# Patient Record
Sex: MALE | Race: OTHER | HISPANIC OR LATINO
[De-identification: names, ages, dates, MRNs, and addresses within clinical notes are randomized per-mention and may not be internally consistent; named-entity substitution may affect disease eponyms.]

---

## 2017-10-05 ENCOUNTER — APPOINTMENT (OUTPATIENT)
Dept: ORTHOPEDIC SURGERY | Facility: CLINIC | Age: 32
End: 2017-10-05

## 2017-10-05 PROBLEM — Z00.00 ENCOUNTER FOR PREVENTIVE HEALTH EXAMINATION: Status: ACTIVE | Noted: 2017-10-05

## 2018-01-04 ENCOUNTER — APPOINTMENT (OUTPATIENT)
Dept: ORTHOPEDIC SURGERY | Facility: CLINIC | Age: 33
End: 2018-01-04

## 2018-01-25 ENCOUNTER — APPOINTMENT (OUTPATIENT)
Dept: ORTHOPEDIC SURGERY | Facility: CLINIC | Age: 33
End: 2018-01-25
Payer: MEDICAID

## 2018-01-25 ENCOUNTER — OUTPATIENT (OUTPATIENT)
Dept: OUTPATIENT SERVICES | Facility: HOSPITAL | Age: 33
LOS: 1 days | End: 2018-01-25
Payer: COMMERCIAL

## 2018-01-25 VITALS — BODY MASS INDEX: 28.14 KG/M2 | WEIGHT: 190 LBS | HEIGHT: 69 IN

## 2018-01-25 DIAGNOSIS — M25.511 PAIN IN RIGHT SHOULDER: ICD-10-CM

## 2018-01-25 DIAGNOSIS — G89.29 PAIN IN RIGHT SHOULDER: ICD-10-CM

## 2018-01-25 PROCEDURE — 73030 X-RAY EXAM OF SHOULDER: CPT

## 2018-01-25 PROCEDURE — 99203 OFFICE O/P NEW LOW 30 MIN: CPT

## 2018-01-25 PROCEDURE — 73030 X-RAY EXAM OF SHOULDER: CPT | Mod: 26,50

## 2021-01-28 ENCOUNTER — TRANSCRIPTION ENCOUNTER (OUTPATIENT)
Age: 36
End: 2021-01-28

## 2021-02-25 ENCOUNTER — RESULT REVIEW (OUTPATIENT)
Age: 36
End: 2021-02-25

## 2021-02-25 ENCOUNTER — OUTPATIENT (OUTPATIENT)
Dept: OUTPATIENT SERVICES | Facility: HOSPITAL | Age: 36
LOS: 1 days | End: 2021-02-25
Payer: COMMERCIAL

## 2021-02-25 ENCOUNTER — APPOINTMENT (OUTPATIENT)
Dept: ORTHOPEDIC SURGERY | Facility: CLINIC | Age: 36
End: 2021-02-25

## 2021-02-25 VITALS — BODY MASS INDEX: 28.14 KG/M2 | HEIGHT: 69 IN | WEIGHT: 190 LBS

## 2021-02-25 PROCEDURE — 73030 X-RAY EXAM OF SHOULDER: CPT

## 2021-02-25 PROCEDURE — 73030 X-RAY EXAM OF SHOULDER: CPT | Mod: 26,RT

## 2021-02-25 RX ORDER — MELOXICAM 15 MG/1
15 TABLET ORAL DAILY
Qty: 30 | Refills: 0 | Status: COMPLETED | COMMUNITY
Start: 2018-01-25 | End: 2021-02-25

## 2021-02-25 NOTE — DISCUSSION/SUMMARY
[de-identified] : 35 M w history of chronic shoulder w a prior diagnoses of impingement and partial RCT presenting w continued pain likely persistent cuff tear and impingement. \par - MR\par - PT\par - follow up after MR

## 2021-02-25 NOTE — HISTORY OF PRESENT ILLNESS
[de-identified] : 35M history of b/l shoulder pain diagnosed with impingement and a partial RCT in 2018 now presenting again due to continued pain R>L shoulder that has been limiting his activities especially with exercise. states that the pain is there at baseline, but worsens with activities especially with lifting. states pain is also present at night and cannot sleep on his sides because it worsens the pain. denies any neck pain or numbness and tingling in the extremities.

## 2021-02-25 NOTE — REASON FOR VISIT
[Follow-Up Visit] : a follow-up visit for [Shoulder Pain] : shoulder pain [FreeTextEntry2] : Right shoulder pain

## 2021-02-25 NOTE — PHYSICAL EXAM
[de-identified] : general: well appearing in NAD\par \par R shoulder\par no gross deformity\par no swelling or effusion appreciated \par mildly limited flexion on the R secondary to pain \par + Neer impingement sign \par negative nelson \par + shirley's test\par 2+ radial pulse \par SILT C2-C8\par 5/5 Delt, biceps, triceps, wrist flex/ex [de-identified] : no acute fxs or dislocation present of R shoulder

## 2021-04-15 ENCOUNTER — TRANSCRIPTION ENCOUNTER (OUTPATIENT)
Age: 36
End: 2021-04-15